# Patient Record
Sex: MALE | Race: WHITE | HISPANIC OR LATINO | Employment: FULL TIME | URBAN - METROPOLITAN AREA
[De-identification: names, ages, dates, MRNs, and addresses within clinical notes are randomized per-mention and may not be internally consistent; named-entity substitution may affect disease eponyms.]

---

## 2017-12-23 ENCOUNTER — APPOINTMENT (EMERGENCY)
Dept: RADIOLOGY | Facility: HOSPITAL | Age: 27
End: 2017-12-23
Payer: COMMERCIAL

## 2017-12-23 ENCOUNTER — HOSPITAL ENCOUNTER (EMERGENCY)
Facility: HOSPITAL | Age: 27
Discharge: HOME | End: 2017-12-23
Admitting: EMERGENCY MEDICINE
Payer: COMMERCIAL

## 2017-12-23 VITALS
OXYGEN SATURATION: 99 % | WEIGHT: 178 LBS | RESPIRATION RATE: 18 BRPM | TEMPERATURE: 99.2 F | DIASTOLIC BLOOD PRESSURE: 72 MMHG | SYSTOLIC BLOOD PRESSURE: 164 MMHG | HEART RATE: 97 BPM

## 2017-12-23 DIAGNOSIS — S52.125A CLOSED NONDISPLACED FRACTURE OF HEAD OF LEFT RADIUS, INITIAL ENCOUNTER: Primary | ICD-10-CM

## 2017-12-23 PROCEDURE — 99283 EMERGENCY DEPT VISIT LOW MDM: CPT

## 2017-12-23 PROCEDURE — 73080 X-RAY EXAM OF ELBOW: CPT

## 2017-12-23 PROCEDURE — 96372 THER/PROPH/DIAG INJ SC/IM: CPT

## 2017-12-23 PROCEDURE — 73110 X-RAY EXAM OF WRIST: CPT

## 2017-12-23 RX ORDER — MORPHINE SULFATE 15 MG/1
7.5 TABLET ORAL EVERY 6 HOURS PRN
Qty: 6 TABLET | Refills: 0 | Status: SHIPPED | OUTPATIENT
Start: 2017-12-23

## 2017-12-23 RX ORDER — FERROUS SULFATE 325(65) MG
325 TABLET ORAL
COMMUNITY

## 2017-12-23 RX ORDER — NAPROXEN 500 MG/1
500 TABLET ORAL 2 TIMES DAILY WITH MEALS
Qty: 20 TABLET | Refills: 0 | Status: SHIPPED | OUTPATIENT
Start: 2017-12-23

## 2017-12-23 RX ORDER — ACETAMINOPHEN 500 MG
500 TABLET ORAL EVERY 8 HOURS PRN
Qty: 30 TABLET | Refills: 0 | Status: SHIPPED | OUTPATIENT
Start: 2017-12-23

## 2017-12-23 RX ORDER — KETOROLAC TROMETHAMINE 30 MG/ML
30 INJECTION, SOLUTION INTRAMUSCULAR; INTRAVENOUS ONCE
Status: COMPLETED | OUTPATIENT
Start: 2017-12-23 | End: 2017-12-23

## 2017-12-23 RX ADMIN — KETOROLAC TROMETHAMINE 30 MG: 30 INJECTION, SOLUTION INTRAMUSCULAR at 17:11

## 2017-12-23 NOTE — DISCHARGE INSTRUCTIONS
- start taking Naprosyn 500 milligrams twice daily for pain  - take Tylenol 1000 milligrams every 8 hours for mild-to-moderate pain  - take half of a morphine 15 mgevery 6 hours for severe pain  If you are taking morphine do not drive or operate machinery  - call Orthopedics in 3 days to schedule follow-up for radial head fracture  - do not take off splint prior to being examined by Orthopedics    Arm Fracture in [facility]:   An arm fracture is a crack or break in one or more of the bones in your arm  An arm fracture may be caused by a fall onto an outstretched hand  It may also be caused by trauma from a car accident or a sports injury  Osteoporosis (brittle bones) can increase your risk for a fracture  DISCHARGE INSTRUCTIONS:   Return to the emergency department if:   · The pain in your injured arm does not get better or gets worse, even after you rest and take medicine  · Your injured arm, hand, or fingers feel numb  · Your arm is swollen, red, and feels warm  · Your skin over the arm fracture is swollen, cold, or pale  · You cannot move your arm, hand, or fingers  Contact your healthcare provider if:   · You have a fever  · Your brace or splint becomes wet, damaged, or comes off  · You have questions or concerns about your injury, treatment, or care  Medicines:   · NSAIDs , such as ibuprofen, help decrease swelling and pain  This medicine is available with or without a doctor's order  NSAIDs can cause stomach bleeding or kidney problems in certain people  If you take blood thinner medicine, always ask your healthcare provider if NSAIDs are safe for you  Always read the medicine label and follow directions  · Acetaminophen  decreases pain  It is available without a doctor's order  Ask how much to take and how often to take it  Follow directions  Acetaminophen can cause liver damage if not taken correctly  · Prescription pain medicine  may be given   Ask how to take this medicine safely  · Take your medicine as directed  Contact your healthcare provider if you think your medicine is not helping or if you have side effects  Tell him or her if you are allergic to any medicine  Keep a list of the medicines, vitamins, and herbs you take  Include the amounts, and when and why you take them  Bring the list or the pill bottles to follow-up visits  Carry your medicine list with you in case of an emergency  Follow up with your healthcare provider within 1 week: You may need to see a bone specialist within 3 to 4 days if you need surgery or further treatment for your arm fracture  Write down your questions so you remember to ask them during your visits  Rest:  You should rest your arm as much as possible  Ask your healthcare provider when you can put pressure or weight on your arm  Also ask when you can return to sports or vigorous exercises  Ice:  Apply ice on your arm for 15 to 20 minutes every hour or as directed  Use an ice pack, or put crushed ice in a plastic bag  Cover it with a towel  Ice helps prevent tissue damage and decreases swelling and pain  Elevate:  Elevate your arm above the level of your heart as often as you can  This will help decrease swelling and pain  Prop your arm on pillows or blankets to keep it elevated comfortably  Care for your cast or splint:  Ask your healthcare provider when it is okay to bathe  Do not get your cast or splint wet  Before you take a bath or shower, cover your cast or splint with a plastic bag  Tape the bag to your skin to help keep water out  Hold your arm away from the water in case the bag leaks  · Check the skin around your cast or splint each day for any redness or open skin  · Do not use a sharp or pointed object to scratch your skin under the cast or splint  Physical therapy:  A physical therapist teaches you exercises to help improve movement and strength, and to decrease pain     © 2017 [employer] Information is for End User's use only and may not be sold, redistributed or otherwise used for commercial purposes  All illustrations and images included in CareNotes® are the copyrighted property of [employer]  or [employer]  The above information is an  only  It is not intended as medical advice for individual conditions or treatments  Talk to your doctor, nurse or pharmacist before following any medical regimen to see if it is safe and effective for you

## 2017-12-23 NOTE — ED PROVIDER NOTES
History  Chief Complaint   Patient presents with    Arm Injury     Patient was coming down ladder to get into cart and fell backwards, put arm out and hit elbow on ground  Patient reporting elbow pain  History provided by:  Patient  Arm Injury   Location:  Elbow  Elbow location:  L elbow  Injury: yes    Time since incident:  2 hours  Pain details:     Quality:  Sharp    Radiates to:  Does not radiate    Severity:  Severe    Onset quality:  Sudden    Timing:  Constant    Progression:  Worsening  Handedness:  Right-handed  Prior injury to area:  No  Relieved by:  Nothing  Exacerbated by: supination/pronation  Ineffective treatments:  None tried  Associated symptoms: decreased range of motion    Associated symptoms: no fatigue, no fever, no neck pain, no stiffness, no swelling and no tingling        Prior to Admission Medications   Prescriptions Last Dose Informant Patient Reported? Taking?   ferrous sulfate 325 (65 Fe) mg tablet   Yes Yes   Sig: Take 325 mg by mouth daily with breakfast      Facility-Administered Medications: None       History reviewed  No pertinent past medical history  History reviewed  No pertinent surgical history  History reviewed  No pertinent family history  I have reviewed and agree with the history as documented  Social History   Substance Use Topics    Smoking status: Current Every Day Smoker     Packs/day: 0 50     Types: Cigarettes    Smokeless tobacco: Never Used    Alcohol use Yes      Comment: occasionally        Review of Systems   Constitutional: Negative for activity change, appetite change, chills, fatigue and fever  HENT: Negative for ear pain, sneezing and sore throat  Eyes: Negative for pain and visual disturbance  Respiratory: Negative for cough and shortness of breath  Cardiovascular: Negative for chest pain and palpitations  Gastrointestinal: Negative for abdominal pain, blood in stool, constipation, diarrhea, nausea and vomiting  Genitourinary: Negative for dysuria and hematuria  Musculoskeletal: Negative for arthralgias, neck pain, neck stiffness and stiffness  Skin: Negative for rash and wound  Neurological: Negative for dizziness, weakness, light-headedness, numbness and headaches  All other systems reviewed and are negative  Physical Exam  ED Triage Vitals [12/23/17 1622]   Temperature Pulse Respirations Blood Pressure SpO2   99 2 °F (37 3 °C) 97 18 164/72 99 %      Temp Source Heart Rate Source Patient Position - Orthostatic VS BP Location FiO2 (%)   Oral Monitor Sitting Right arm --      Pain Score       9           Orthostatic Vital Signs  Vitals:    12/23/17 1622   BP: 164/72   Pulse: 97   Patient Position - Orthostatic VS: Sitting       Physical Exam   Constitutional: He is oriented to person, place, and time  He appears well-developed and well-nourished  No distress  HENT:   Head: Normocephalic and atraumatic  Nose: Nose normal    Eyes: Conjunctivae and EOM are normal  Pupils are equal, round, and reactive to light  Neck: Normal range of motion  Neck supple  Cardiovascular: Normal rate, regular rhythm, normal heart sounds and intact distal pulses  Exam reveals no gallop and no friction rub  No murmur heard  Pulmonary/Chest: Effort normal and breath sounds normal  No respiratory distress  He has no wheezes  He has no rales  Abdominal: Soft  He exhibits no distension  There is no tenderness  Musculoskeletal: Normal range of motion  He exhibits tenderness (over left radial head  )  He exhibits no edema or deformity  Unable to pronate or supinate left forearm secondary to pain  Can fully flex/extend at left elbow  No shoulder/clavicle pain  Painful wrist ROM at full flexion  No snuffbox/scaffoid tenderness  Lymphadenopathy:     He has no cervical adenopathy  Neurological: He is alert and oriented to person, place, and time  Skin: Skin is warm and dry  Capillary refill takes less than 2 seconds  He is not diaphoretic  No erythema  No pallor  Nursing note and vitals reviewed  ED Medications  Medications   ketorolac (TORADOL) injection 30 mg (30 mg Intramuscular Given 12/23/17 1711)       Diagnostic Studies  Results Reviewed     None                 XR elbow 3+ vw LEFT   ED Interpretation by Suzan Gonzalez PA-C (12/23 1712)   Abnormal   Buckle fracture of radial head with interosseus widening suggestive of membrane tear      Final Result by Marie Carreno MD (12/23 3316)      Acute radial head fracture with effusion  This report is in agreement with the preliminary interpretation  Workstation performed: [id number]         XR wrist 3+ views LEFT   ED Interpretation by Suzan Gonzalez PA-C (12/23 8285)   No acute fracture      Final Result by Marie Carreno MD (12/23 4640)      No acute osseous abnormality  Workstation performed: [id number]                    Procedures  Static Splint Application  Date/Time: 12/23/2017 6:19 PM  Performed by: Caleb Machado by: Marian Crew     Consent:     Consent obtained:  Verbal    Consent given by:  Patient  Universal protocol:     Imaging studies available: yes    Indication:     Indications: fracture    Pre-procedure details:     Sensation:  Normal  Procedure details:     Laterality:  Left    Location:  Elbow    Elbow:  L elbow    Splint type:  Long arm and sugar tong    Supplies:  Ortho-Glass, sling and cotton padding  Post-procedure details:     Pain:  Unchanged    Sensation:  Normal    Neurovascular Exam: skin pink, capillary refill <2 sec and skin intact, warm, and dry      Patient tolerance of procedure:   Tolerated well, no immediate complications           Phone Contacts  ED Phone Contact    ED Course  ED Course                                MDM  Number of Diagnoses or Management Options  Closed nondisplaced fracture of head of left radius, initial encounter: new and requires workup  Diagnosis management comments: ddx to include but not limited to: radial head fracture, olecranon fracture, elbow sprain, clavicular fracture, scaphoid fracture    Pt is a 33 yo male who reports tripping while stepping out of a car at work, landing on an outstretched left hand  Pt reports left elbow pain immediately  No numbness or tingling  No shoulder or wrist pain  Patient states he cant rotate his forearm but can still flex/extend arm  No prior injuries to area  Plan will be to get XR to rule out fracture  Update: radial head fracture with joint effusion seen on xr  Will check left wrist XR to rule out fracture associated with prox radial head fracture  Will place patient in posterior long arm and elbow stirrup splint  Shoulder sling  Morphine IR as needed for severe pain x3 days  Naproxen 500 BID for swelling and pain  Tylenol 1000mg q8 for pain  Orthopedics follow up in 3 days  Strict return precautions  Amount and/or Complexity of Data Reviewed  Tests in the radiology section of CPT®: ordered and reviewed    Risk of Complications, Morbidity, and/or Mortality  Presenting problems: moderate  Diagnostic procedures: low  Management options: moderate    Patient Progress  Patient progress: improved    CritCare Time    Disposition  Final diagnoses:   Closed nondisplaced fracture of head of left radius, initial encounter     Time reflects when diagnosis was documented in both MDM as applicable and the Disposition within this note     Time User Action Codes Description Comment    12/23/2017  5:56 PM Mary Lim Add [[id number]] Closed nondisplaced fracture of head of left radius, initial encounter       ED Disposition     ED Disposition Condition Comment    Discharge  Malvin Filter discharge to home/self care      Condition at discharge: Stable        Follow-up Information     Follow up With Specialties Details Why Contact Info Additional [address] [facility] Orthopedic Surgery Call in 3 days for radial head fracture follow up  [address] [id number]  [facility] Emergency Department Emergency Medicine  If symptoms worsen [address]  [phone]  ED, [address], [place], [address]        Discharge Medication List as of 12/23/2017  6:17 PM      START taking these medications    Details   acetaminophen (TYLENOL) 500 mg tablet Take 1 tablet by mouth every 8 (eight) hours as needed for mild pain, Starting Sat 12/23/2017, Print      morphine (MSIR) 15 mg tablet Take 0 5 tablets by mouth every 6 (six) hours as needed for severe pain Max Daily Amount: 30 mg, Starting Sat 12/23/2017, Print      naproxen (NAPROSYN) 500 mg tablet Take 1 tablet by mouth 2 (two) times a day with meals, Starting Sat 12/23/2017, Print         CONTINUE these medications which have NOT CHANGED    Details   ferrous sulfate 325 (65 Fe) mg tablet Take 325 mg by mouth daily with breakfast, Historical Med           No discharge procedures on file      ED Provider  Electronically Signed by           Maria De Jesus Walters PA-C  12/24/17 4959

## 2017-12-29 ENCOUNTER — ALLSCRIPTS OFFICE VISIT (OUTPATIENT)
Dept: OTHER | Facility: OTHER | Age: 27
End: 2017-12-29

## 2018-01-03 NOTE — PROGRESS NOTES
Assessment   1  Closed nondisplaced fracture of head of left radius, initial encounter (813 05) (S52 [id number])    Plan   Closed nondisplaced fracture of head of left radius, initial encounter    · Sling; Status:Complete;   Done: [id number]   · Follow-up visit in 1 month Evaluation and Treatment  Follow-up  Status: Hold For -    Scheduling  Requested for: [id number]    Discussion/Summary      Discussed history, subjective symptoms and physical exam findings with patient  He has a closed nondisplaced fracture of the radial head  He is now 6 days post injury  He can slowly come out of the sling and start with ROM exercises as tolerated  Will see him back in 1 month for re-evaluation and check ROM  He should avoid any lifting or weight bearing of the left arm until re-evaluation  The patient, patient's family was counseled regarding diagnostic results,-- instructions for management,-- risk factor reductions,-- prognosis,-- patient and family education,-- impressions,-- risks and benefits of treatment options,-- importance of compliance with treatment  The treatment plan was reviewed with the patient/guardian  The patient/guardian understands and agrees with the treatment plan       Self Referrals: No      Chief Complaint   1  Wrist Pain  Left elbow injury      History of Present Illness   Elbow Problem: The patient is being seen for an initial evaluation of an elbow problem  The patient is right hand dominant  Symptoms:  decreased range of motion  The patient is currently experiencing symptoms  Exacerbating factors:  moving the elbow  Relieving factors:  immobilization  HPI: 31 yo M comes with family to be evaluated for left elbow injury sustained on 12/23/17 when he fell down stairs at home  He slipped and fell and placed his left arm out to brace his fall  He had significant pain with his left elbow immediately after falling   He presented to the ED where he had radiographs done demonstrating a radial head fracture with no displacement  He has been wearing a sling and splint since the injury  No numbness, tingling or weakness in his hand or fingers  Wrist Pain: LUC AMAYA presents with complaints of no wrist pain  Review of Systems        Constitutional: No fever or chills, feels well, no tiredness, no recent weight loss or weight gain  Eyes: No complaints of red eyes, no eyesight problems  ENT: no complaints of loss of hearing, no nosebleeds, no sore throat  Cardiovascular: No complaints of chest pain, no palpitations, no leg claudication or lower extremity edema  Respiratory: No complaints of shortness of breath, no wheezing, no cough  Gastrointestinal: No complaints of abdominal pain, no constipation, no nausea or vomiting, no diarrhea or bloody stools  Genitourinary: No complaints of dysuria or incontinence, no hesitancy, no nocturia  Musculoskeletal: as noted in HPI  Integumentary: No complaints of skin rash or lesion, no itching or dry skin, no skin wounds  Neurological: No complaints of headache, no confusion, no numbness or tingling, no dizziness  Psychiatric: No suicidal thoughts, no anxiety, no depression  Endocrine: No muscle weakness, no frequent urination, no excessive thirst, no feelings of weakness  ROS reviewed  Past Medical History      The active problems and past medical history were reviewed and updated today  Surgical History      The surgical history was reviewed and updated today  Family History   Mother    · Family history of malignant neoplasm (V16 9) (Z80 9)  Grandmother    · Family history of arthritis (V17 7) (Z82 61)     The family history was reviewed and updated today  Social History    · Coffee   · Current every day smoker (305 1) (F17 200)   · Social drinker (V49 89) (Z78 9)  The social history was reviewed and updated today  The social history was reviewed and is unchanged  Current Meds   1  Morphine Sulfate ER 15 MG TB12;     Therapy: (Recorded:[id number]) to Recorded  2  Naproxen 500 MG Oral Tablet; Therapy: (Recorded:65Oae2072) to Recorded     The medication list was reviewed and updated today  Allergies   1  No Known Drug Allergies    Vitals    Recorded: 10UAM9257 10:12AM   Heart Rate 68   Systolic 928   Diastolic 71   Height 5 ft 9 in     Physical Exam      Left Elbow: Appearance: Normal  Tenderness: radial head  Flexion: restricted AROM 110 degrees  Extension: restricted AROM 20 degrees  Pain with limited pronation and supination  Motor: Normal       Constitutional - General appearance: Normal       Musculoskeletal - Gait and station: Normal -- Upper extremity compartments: Normal       Cardiovascular - Pulses: Normal -- Examination of extremities for edema and/or varicosities: Normal       Abdomen - Abdomen - Soft, nontender, nondistended  Abdomen: The abdomen was flat  Skin - Skin and subcutaneous tissue: Normal -- Palpation of skin and subcutaneous tissue: Normal       Neurologic - Sensation: Normal -- Upper extremity peripheral neuro exam: Normal       Psychiatric - Orientation to person, place, and time: Normal -- Mood and affect: Normal       Eyes      Conjunctiva and lids: Normal        Pupils and irises: Normal        Results/Data   I personally reviewed the films/images/results in the office today  My interpretation follows  X-ray Review Radiographs from the ED were reviewed  There is a non displaced fracture of the radial head  There are no acute osseous abnormalities of the wrist       Message   Return to work or school:    Reena Suero is under my professional care  He was seen in my office on 12/29/17        No weight bearing or lifting with left arm until cleared by physician  Elisabet Botello DO        Attending Note   Attending Note [facility]: Attending Note:1  I interviewed, took the history and examined the patient1 ,-- I discussed the case with the Resident and reviewed the Resident's note1 ,-- I supervised the Resident1 -- and-- I agree with the Resident management plan as it was presented to [facility]   Level of Participation:1  I was present in clinic and examined the patient1   I agree with the Resident's note1        1 Amended By: Wiliam Adhikari; Jan 02 2018 9:56 AM EST      Future Appointments      Date/Time Provider Specialty Site   01/29/2018 10:40 AM CODIE Goode  Orthopedic Surgery [facility]     Signatures    Electronically signed by : Vane Patterson DO; Dec 29 2017  1:51PM EST                       (Author)     Electronically signed by :  CODIE Molina ; Jan 2 2018  9:56AM EST                       (Author)

## 2018-01-15 PROCEDURE — 88305 TISSUE EXAM BY PATHOLOGIST: CPT | Performed by: INTERNAL MEDICINE

## 2018-01-15 PROCEDURE — 88313 SPECIAL STAINS GROUP 2: CPT | Performed by: INTERNAL MEDICINE

## 2018-01-15 PROCEDURE — 88342 IMHCHEM/IMCYTCHM 1ST ANTB: CPT | Performed by: INTERNAL MEDICINE

## 2018-01-16 ENCOUNTER — LAB REQUISITION (OUTPATIENT)
Dept: LAB | Facility: HOSPITAL | Age: 28
End: 2018-01-16
Payer: COMMERCIAL

## 2018-01-16 DIAGNOSIS — R11.2 NAUSEA WITH VOMITING: ICD-10-CM

## 2018-01-16 DIAGNOSIS — R10.13 EPIGASTRIC PAIN: ICD-10-CM

## 2018-01-16 DIAGNOSIS — K21.9 GASTRO-ESOPHAGEAL REFLUX DISEASE WITHOUT ESOPHAGITIS: ICD-10-CM

## 2018-01-23 VITALS — HEART RATE: 68 BPM | HEIGHT: 69 IN | SYSTOLIC BLOOD PRESSURE: 108 MMHG | DIASTOLIC BLOOD PRESSURE: 71 MMHG

## 2018-01-23 NOTE — MISCELLANEOUS
Message  Return to work or school:   Naveen Reid is under my professional care  He was seen in my office on 12/29/17       No weight bearing or lifting with left arm until cleared by physician  Rhonda Camp DO  Signatures   Electronically signed by : Rhonda Camp DO; Dec 29 2017  1:00PM EST                       (Author)    Electronically signed by : Rhonda Camp DO; Dec 29 2017  1:51PM EST                       (Author)    Electronically signed by : Rhonda Camp DO; Dec 29 2017  1:51PM EST                       (Author)    Electronically signed by :  CODIE Luis ; Jan 2 2018  9:56AM EST                       (Author)

## 2018-02-05 ENCOUNTER — OFFICE VISIT (OUTPATIENT)
Dept: OBGYN CLINIC | Facility: OTHER | Age: 28
End: 2018-02-05

## 2018-02-05 VITALS
BODY MASS INDEX: 24.44 KG/M2 | SYSTOLIC BLOOD PRESSURE: 123 MMHG | HEIGHT: 69 IN | WEIGHT: 165 LBS | HEART RATE: 85 BPM | DIASTOLIC BLOOD PRESSURE: 82 MMHG

## 2018-02-05 DIAGNOSIS — S52.125D CLOSED NONDISPLACED FRACTURE OF HEAD OF LEFT RADIUS WITH ROUTINE HEALING, SUBSEQUENT ENCOUNTER: Primary | ICD-10-CM

## 2018-02-05 PROBLEM — S52.126D CLOSED NONDISPLACED FRACTURE OF HEAD OF RADIUS WITH ROUTINE HEALING: Status: ACTIVE | Noted: 2018-02-05

## 2018-02-05 PROCEDURE — 99024 POSTOP FOLLOW-UP VISIT: CPT | Performed by: ORTHOPAEDIC SURGERY

## 2018-02-05 RX ORDER — DICYCLOMINE HYDROCHLORIDE 10 MG/1
10 CAPSULE ORAL
COMMUNITY

## 2018-02-05 RX ORDER — OMEPRAZOLE 40 MG/1
CAPSULE, DELAYED RELEASE ORAL
Refills: 2 | COMMUNITY
Start: 2018-01-16

## 2018-02-05 NOTE — PROGRESS NOTES
Assessment/Plan:  Assessment/Plan   Diagnoses and all orders for this visit:    Closed nondisplaced fracture of head of left radius with routine healing, subsequent encounter    Other orders  -     omeprazole (PriLOSEC) 40 MG capsule; TAKE 1 CAPSULE BY MOUTH EVERY DAY 1/2HR BEFORE BREAKFAST  -     dicyclomine (BENTYL) 10 mg capsule; Take 10 mg by mouth 4 (four) times a day (before meals and at bedtime)             We discussed with Mr Natalie Villanueva our findings of developing stiffness  We have recommended started strengthening using resistance bands for elbow flexion and extension  We will follow in 4 weeks if there are continued symptoms  If he returns to baseline, then he can call to cancel this appt  Subjective:          Regina Miguel is a 32year old RHD male presenting for re-evaluation of a left elbow radial head fracture sustained on 12/23/2017  At his last visit, he was instructed to begin ROM exercises with activity modifications  He has been compliant with the previous recommendations  Today, he explains that he continues to notice a sharp pain at end range of motion, though his range of motion has improved dramatically since last visit  He mostly notices this pain over his triceps and forearm flexors rather than over the radial head  He denies any numbness or tingling of the arm/hand  The following portions of the patient's history were reviewed and updated as appropriate: allergies, current medications, past family history, past medical history, past social history, past surgical history and problem list     Review of Systems   Musculoskeletal:        As noted in HPI   All other systems reviewed and are negative  Objective:  Left Elbow Exam     Tenderness   Left elbow tenderness location: Mild tenderness to deep palpation noted of the insertion of the triceps during resisted elbow extension       Range of Motion   Extension: normal   Flexion: normal   Pronation: normal   Supination: normal Muscle Strength   Pronation:  5/5   Supination:  5/5     Tests Tinel's Sign (cubital tunnel): negative    Other   Erythema: absent  Sensation: normal  Pulse: present            Physical Exam   Constitutional: He is oriented to person, place, and time  He appears well-developed and well-nourished  No distress  HENT:   Head: Normocephalic and atraumatic  Eyes: Conjunctivae and EOM are normal  Pupils are equal, round, and reactive to light  Right eye exhibits no discharge  Left eye exhibits no discharge  No scleral icterus  Cardiovascular: Normal rate, regular rhythm and intact distal pulses  Pulmonary/Chest: Effort normal  No respiratory distress  Neurological: He is alert and oriented to person, place, and time  Skin: Skin is warm and dry  He is not diaphoretic  Psychiatric: He has a normal mood and affect  His behavior is normal  Judgment and thought content normal        I have personally reviewed pertinent films in PACS

## 2019-01-14 ENCOUNTER — HOSPITAL ENCOUNTER (EMERGENCY)
Facility: HOSPITAL | Age: 29
Discharge: HOME | End: 2019-01-14
Attending: EMERGENCY MEDICINE
Payer: COMMERCIAL

## 2019-01-14 VITALS
HEART RATE: 80 BPM | DIASTOLIC BLOOD PRESSURE: 90 MMHG | RESPIRATION RATE: 18 BRPM | BODY MASS INDEX: 23.63 KG/M2 | WEIGHT: 160 LBS | OXYGEN SATURATION: 100 % | TEMPERATURE: 98.8 F | SYSTOLIC BLOOD PRESSURE: 148 MMHG

## 2019-01-14 DIAGNOSIS — R10.13 EPIGASTRIC PAIN: Primary | ICD-10-CM

## 2019-01-14 LAB
ALBUMIN SERPL BCP-MCNC: 4.8 G/DL (ref 3.5–5)
ALP SERPL-CCNC: 67 U/L (ref 46–116)
ALT SERPL W P-5'-P-CCNC: 27 U/L (ref 12–78)
ANION GAP SERPL CALCULATED.3IONS-SCNC: 6 MMOL/L (ref 4–13)
AST SERPL W P-5'-P-CCNC: 16 U/L (ref 5–45)
BASOPHILS # BLD AUTO: 0.03 THOUSANDS/ΜL (ref 0–0.1)
BASOPHILS NFR BLD AUTO: 0 % (ref 0–1)
BILIRUB SERPL-MCNC: 1.12 MG/DL (ref 0.2–1)
BUN SERPL-MCNC: 13 MG/DL (ref 5–25)
CALCIUM SERPL-MCNC: 9.1 MG/DL (ref 8.3–10.1)
CHLORIDE SERPL-SCNC: 102 MMOL/L (ref 100–108)
CO2 SERPL-SCNC: 28 MMOL/L (ref 21–32)
CREAT SERPL-MCNC: 1.12 MG/DL (ref 0.6–1.3)
EOSINOPHIL # BLD AUTO: 0.05 THOUSAND/ΜL (ref 0–0.61)
EOSINOPHIL NFR BLD AUTO: 1 % (ref 0–6)
ERYTHROCYTE [DISTWIDTH] IN BLOOD BY AUTOMATED COUNT: 11.7 % (ref 11.6–15.1)
GFR SERPL CREATININE-BSD FRML MDRD: 88 ML/MIN/1.73SQ M
GLUCOSE SERPL-MCNC: 100 MG/DL (ref 65–140)
HCT VFR BLD AUTO: 45.1 % (ref 36.5–49.3)
HGB BLD-MCNC: 15.5 G/DL (ref 12–17)
IMM GRANULOCYTES # BLD AUTO: 0.03 THOUSAND/UL (ref 0–0.2)
IMM GRANULOCYTES NFR BLD AUTO: 0 % (ref 0–2)
LIPASE SERPL-CCNC: 53 U/L (ref 73–393)
LYMPHOCYTES # BLD AUTO: 2.49 THOUSANDS/ΜL (ref 0.6–4.47)
LYMPHOCYTES NFR BLD AUTO: 23 % (ref 14–44)
MCH RBC QN AUTO: 32 PG (ref 26.8–34.3)
MCHC RBC AUTO-ENTMCNC: 34.4 G/DL (ref 31.4–37.4)
MCV RBC AUTO: 93 FL (ref 82–98)
MONOCYTES # BLD AUTO: 0.56 THOUSAND/ΜL (ref 0.17–1.22)
MONOCYTES NFR BLD AUTO: 5 % (ref 4–12)
NEUTROPHILS # BLD AUTO: 7.93 THOUSANDS/ΜL (ref 1.85–7.62)
NEUTS SEG NFR BLD AUTO: 71 % (ref 43–75)
NRBC BLD AUTO-RTO: 0 /100 WBCS
PLATELET # BLD AUTO: 286 THOUSANDS/UL (ref 149–390)
PMV BLD AUTO: 9.7 FL (ref 8.9–12.7)
POTASSIUM SERPL-SCNC: 3.6 MMOL/L (ref 3.5–5.3)
PROT SERPL-MCNC: 8.5 G/DL (ref 6.4–8.2)
RBC # BLD AUTO: 4.84 MILLION/UL (ref 3.88–5.62)
SODIUM SERPL-SCNC: 136 MMOL/L (ref 136–145)
WBC # BLD AUTO: 11.09 THOUSAND/UL (ref 4.31–10.16)

## 2019-01-14 PROCEDURE — 82272 OCCULT BLD FECES 1-3 TESTS: CPT

## 2019-01-14 PROCEDURE — 85025 COMPLETE CBC W/AUTO DIFF WBC: CPT | Performed by: EMERGENCY MEDICINE

## 2019-01-14 PROCEDURE — 99284 EMERGENCY DEPT VISIT MOD MDM: CPT

## 2019-01-14 PROCEDURE — 83690 ASSAY OF LIPASE: CPT | Performed by: EMERGENCY MEDICINE

## 2019-01-14 PROCEDURE — 80053 COMPREHEN METABOLIC PANEL: CPT | Performed by: EMERGENCY MEDICINE

## 2019-01-14 PROCEDURE — 36415 COLL VENOUS BLD VENIPUNCTURE: CPT

## 2019-01-14 RX ORDER — MAGNESIUM HYDROXIDE/ALUMINUM HYDROXICE/SIMETHICONE 120; 1200; 1200 MG/30ML; MG/30ML; MG/30ML
30 SUSPENSION ORAL ONCE
Status: COMPLETED | OUTPATIENT
Start: 2019-01-14 | End: 2019-01-14

## 2019-01-14 RX ADMIN — ALUMINUM HYDROXIDE, MAGNESIUM HYDROXIDE, AND SIMETHICONE 30 ML: 200; 200; 20 SUSPENSION ORAL at 17:00

## 2019-01-14 RX ADMIN — LIDOCAINE HYDROCHLORIDE 15 ML: 20 SOLUTION ORAL; TOPICAL at 17:01

## 2019-01-14 NOTE — ED PROVIDER NOTES
ASSESSMENT AND PLAN    Marcellus Angelo is a 25 y o  male with a history of   Peptic ulcer disease, who presents with  Epigastric pain, intermittent over the last several months, radiating to the right upper quadrant,  Associated with dark stools  On arrival, the patient is hemodynamically stable and well-appearing without acute distress, with a nontoxic appearance  His physical exam is largely unremarkable, including a benign abdominal exam, as well as a normal rectal exam with guaiac-negative stool in the rectal vault   -Lab work largely unremarkable, including normal LFTs  Unclear etiology of the patient's prior LFT elevation  - bedside right upper quadrant ultrasound negative for signs for cholecystitis  - patient was given GI cocktail, with viscous lidocaine and Maalox, and reports relief of his symptoms   -  Based on benign abdominal exam, as well as chronic nature of the symptoms, outpatient follow-up is recommended  The patient already has follow-up established with a gastroenterologist, and I recommended the patient contact this physician for re-evaluation, and possible colonoscopy  - plan for discharge home  Strict return precautions provided    History  Chief Complaint   Patient presents with    Abdominal Pain     pt states he has had ulcers in his stomach in the past  pt reports black stool, loss of appetite, weight gain, and abdominal pain  This is a 64-year-old male who presents for evaluation of epigastric pain  The patient reports migratory abdominal pain over the last several months, which had initially improved after being given Protonix by his gastroenterologist, but has returned  He does report a history of ulcers, which were diagnosed via EGD, however has not received a colonoscopy  He has no other relevant medical history    The patient states that "my liver enzymes were elevated", seen on lab work done at outside facility, so he came to the emergency department  Today due to concerns that something may been wrong with his liver  The patient states that that today his pain is located in the midline epigastrium, radiating to the right side, sharp in nature, and worse with food  Patient endorses several bowel movements, reported as "dark stools"  He denies any nausea, vomiting, or diarrhea  He states the pain is similar to his normal abdominal pain, however it is at a different location today  He denies any fevers, chills, chest pain, shortness of breath, nausea, vomiting, diarrhea, urinary symptoms  He has no recent travel, recent surgeries, recent hospitalizations  Prior to Admission Medications   Prescriptions Last Dose Informant Patient Reported? Taking?   acetaminophen (TYLENOL) 500 mg tablet   No No   Sig: Take 1 tablet by mouth every 8 (eight) hours as needed for mild pain   dicyclomine (BENTYL) 10 mg capsule  Self Yes No   Sig: Take 10 mg by mouth 4 (four) times a day (before meals and at bedtime)   ferrous sulfate 325 (65 Fe) mg tablet   Yes No   Sig: Take 325 mg by mouth daily with breakfast   morphine (MSIR) 15 mg tablet   No No   Sig: Take 0 5 tablets by mouth every 6 (six) hours as needed for severe pain Max Daily Amount: 30 mg   naproxen (NAPROSYN) 500 mg tablet   No No   Sig: Take 1 tablet by mouth 2 (two) times a day with meals   omeprazole (PriLOSEC) 40 MG capsule   Yes No   Sig: TAKE 1 CAPSULE BY MOUTH EVERY DAY 1/2HR BEFORE BREAKFAST      Facility-Administered Medications: None       Past Medical History:   Diagnosis Date    Closed nondisplaced fracture of head of radius with routine healing 2/5/2018       History reviewed  No pertinent surgical history  Family History   Problem Relation Age of Onset    Cancer Mother     No Known Problems Father     Diabetes Paternal Grandmother      I have reviewed and agree with the history as documented      Social History   Substance Use Topics    Smoking status: Current Every Day Smoker     Packs/day: 0 50     Types: Cigarettes    Smokeless tobacco: Never Used    Alcohol use Yes      Comment: occasionally        Review of Systems   Constitutional: Negative for chills and fever  HENT: Negative for congestion and sinus pain  Eyes: Negative for photophobia and visual disturbance  Respiratory: Negative for cough and shortness of breath  Cardiovascular: Negative for chest pain and palpitations  Gastrointestinal: Positive for abdominal pain  Negative for blood in stool, diarrhea, nausea and vomiting  Genitourinary: Negative for dysuria and hematuria  Musculoskeletal: Negative for neck pain and neck stiffness  Skin: Negative for pallor and rash  Neurological: Negative for light-headedness and headaches  Physical Exam  ED Triage Vitals [01/14/19 1335]   Temperature Pulse Respirations Blood Pressure SpO2   98 8 °F (37 1 °C) 80 18 148/90 100 %      Temp Source Heart Rate Source Patient Position - Orthostatic VS BP Location FiO2 (%)   Oral Monitor Lying Right arm --      Pain Score       4           Orthostatic Vital Signs  Vitals:    01/14/19 1335   BP: 148/90   Pulse: 80   Patient Position - Orthostatic VS: Lying       Physical Exam   Constitutional: He is oriented to person, place, and time  Resting comfortably on stretcher  Awake alert, well-appearing, no acute distress  Nontoxic in appearance  Appears stated age   HENT:   Head: Normocephalic and atraumatic  Mouth/Throat: Oropharynx is clear and moist  No oropharyngeal exudate  Eyes: Pupils are equal, round, and reactive to light  No scleral icterus  Neck: Normal range of motion  No JVD present  Cardiovascular: Normal rate, regular rhythm and normal heart sounds  No murmur heard  Pulmonary/Chest: Effort normal  No respiratory distress  He has no wheezes  He has no rales  Abdominal: Soft  He exhibits no distension  There is no tenderness  Genitourinary: Rectum normal  Rectal exam shows guaiac negative stool     Musculoskeletal: Normal range of motion  He exhibits no edema  Neurological: He is alert and oriented to person, place, and time  He exhibits normal muscle tone  Skin: Skin is warm and dry  No rash noted  No pallor  ED Medications  Medications   lidocaine viscous (XYLOCAINE) 2 % mucosal solution 15 mL (15 mL Swish & Spit Given 1/14/19 1701)   aluminum-magnesium hydroxide-simethicone (MYLANTA) 200-200-20 mg/5 mL oral suspension 30 mL (30 mL Oral Given 1/14/19 1700)       Diagnostic Studies  Results Reviewed     Procedure Component Value Units Date/Time    Comprehensive metabolic panel [[id number]]  (Abnormal) Collected:  01/14/19 1345    Lab Status:  Final result Specimen:  Blood from Arm, Left Updated:  01/14/19 1423     Sodium 136 mmol/L      Potassium 3 6 mmol/L      Chloride 102 mmol/L      CO2 28 mmol/L      ANION GAP 6 mmol/L      BUN 13 mg/dL      Creatinine 1 12 mg/dL      Glucose 100 mg/dL      Calcium 9 1 mg/dL      AST 16 U/L      ALT 27 U/L      Alkaline Phosphatase 67 U/L      Total Protein 8 5 (H) g/dL      Albumin 4 8 g/dL      Total Bilirubin 1 12 (H) mg/dL      eGFR 88 ml/min/1 73sq m     Narrative:         National Kidney Disease Education Program recommendations are as follows:  GFR calculation is accurate only with a steady state creatinine  Chronic Kidney disease less than 60 ml/min/1 73 sq  meters  Kidney failure less than 15 ml/min/1 73 sq  meters      Lipase [[id number]]  (Abnormal) Collected:  01/14/19 1345    Lab Status:  Final result Specimen:  Blood from Arm, Left Updated:  01/14/19 1416     Lipase 53 (L) u/L     CBC and differential [[id number]]  (Abnormal) Collected:  01/14/19 1345    Lab Status:  Final result Specimen:  Blood from Arm, Left Updated:  01/14/19 1358     WBC 11 09 (H) Thousand/uL      RBC 4 84 Million/uL      Hemoglobin 15 5 g/dL      Hematocrit 45 1 %      MCV 93 fL      MCH 32 0 pg      MCHC 34 4 g/dL      RDW 11 7 %      MPV 9 7 fL      Platelets 631 Thousands/uL      nRBC 0 /100 WBCs Neutrophils Relative 71 %      Immat GRANS % 0 %      Lymphocytes Relative 23 %      Monocytes Relative 5 %      Eosinophils Relative 1 %      Basophils Relative 0 %      Neutrophils Absolute 7 93 (H) Thousands/µL      Immature Grans Absolute 0 03 Thousand/uL      Lymphocytes Absolute 2 49 Thousands/µL      Monocytes Absolute 0 56 Thousand/µL      Eosinophils Absolute 0 05 Thousand/µL      Basophils Absolute 0 03 Thousands/µL                  No orders to display         Procedures  Procedures      Phone Consults  ED Phone Contact    ED Course                               MDM  CritCare Time    Disposition  Final diagnoses:   Epigastric pain     Time reflects when diagnosis was documented in both MDM as applicable and the Disposition within this note     Time User Action Codes Description Comment    1/14/2019  5:46 PM Gianni Disla Add [R10 13] Epigastric pain       ED Disposition     ED Disposition Condition Comment    Discharge  Izzy Offer discharge to home/self care      Condition at discharge: Good        Follow-up Information     Follow up With Specialties Details Why Contact Info Additional [address] Internal Medicine   [facility] [address] [facility] [id number]  [address] [place] [address], [address] [place] [address], [place], [place], [phone]    [facility]   [facility] 10 [phone]  [facility] [address] [facility] [address] [place] [address], [phone]          Discharge Medication List as of 1/14/2019  5:47 PM      CONTINUE these medications which have NOT CHANGED    Details   acetaminophen (TYLENOL) 500 mg tablet Take 1 tablet by mouth every 8 (eight) hours as needed for mild pain, Starting Sat 12/23/2017, Print      dicyclomine (BENTYL) 10 mg capsule Take 10 mg by mouth 4 (four) times a day (before meals and at bedtime), Historical Med      ferrous sulfate 325 (65 Fe) mg tablet Take 325 mg by mouth daily with breakfast, Historical Med      morphine (MSIR) 15 mg tablet Take 0 5 tablets by mouth every 6 (six) hours as needed for severe pain Max Daily Amount: 30 mg, Starting Sat 12/23/2017, Print      naproxen (NAPROSYN) 500 mg tablet Take 1 tablet by mouth 2 (two) times a day with meals, Starting Sat 12/23/2017, Print      omeprazole (PriLOSEC) 40 MG capsule TAKE 1 CAPSULE BY MOUTH EVERY DAY 1/2HR BEFORE BREAKFAST, Historical Med           No discharge procedures on file  ED Provider  Attending physically available and evaluated [place] I managed the patient along with the ED Attending      Electronically Signed by         Ayan Waters MD  01/14/19 2550

## 2019-01-14 NOTE — DISCHARGE INSTRUCTIONS
Epigastric Pain   WHAT YOU NEED TO KNOW:   What do I need to know about epigastric pain? Epigastric pain is felt in the middle of the upper abdomen, between the ribs and the bellybutton  The pain may be mild or severe  Pain may spread from or to another part of your body  Epigastric pain may be a sign of a serious health problem that needs to be treated  What causes epigastric pain? The cause of your pain may not be known  The following are common causes:  · Inflammation of your stomach, liver, pancreas, or intestines    · Heart problems, such as a heart attack    · Digestion problems, such as indigestion, GERD, or lactose intolerance    · Medical conditions, such as an ulcer, a hernia, irritable bowel syndrome (IBS), or cancer    · A blockage in your bowels or gallbladder    · A bladder infection    · An injury or previous surgery in your abdomen  What other signs and symptoms may I have with epigastric pain? Signs and symptoms will depend on what is causing your pain  · Nausea, vomiting, bloating, constipation, or diarrhea    · Loss of appetite, weight loss, feeling of fullness as you start to eat    · Movement relieves the pain or makes it worse, or only certain positions are comfortable    · Pain when you eat, or pain that is relieved when you eat or have a bowel movement    · Sore throat or a hoarse voice  How is epigastric pain diagnosed and treated? Your healthcare provider will feel your abdomen to see if it is tender or rigid  He may change or stop any medicine you are taking that is causing your pain  Your pain may go away without treatment, or you may need any of the following:  · Medicines  may be given to treat pain or stop vomiting  You may also need medicines to reduce or control stomach acid, or treat an infection  · Blood or urine tests  may show problems such as infection or inflammation  The tests may also show how well your liver is working      · An x-ray  is used to check your kidneys and bladder  · An ultrasound  is used to check your gallbladder for stones or other blockage  · A bowel movement sample  may be tested for blood  How can I manage my symptoms? · Keep a record of your symptoms  Include when the pain starts, how long it lasts, and if it is sharp or dull  Also include any foods you ate or activities you did before the pain started  Keep track of anything that helped the pain  · Eat a variety of healthy foods  Healthy foods include fruits, vegetables, whole-grain breads, low-fat dairy products, beans, lean meats, and fish  Ask if you need to be on a special diet  Certain foods may cause your pain, such as alcohol or foods that are high in fat  You may need to eat smaller meals and to eat more often than usual     · Drink liquids as directed  Ask how much liquid to drink each day and which liquids are best for you  Do not have drinks that contain alcohol or caffeine  Call 911 for any of the following:   · You have any of the following signs of a heart attack:      ¨ Squeezing, pressure, or pain in your chest that lasts longer than 5 minutes or returns    ¨ Discomfort or pain in your back, neck, jaw, stomach, or arm     ¨ Trouble breathing    ¨ Nausea or vomiting    ¨ Lightheadedness or a sudden cold sweat, especially with chest pain or trouble breathing    · You have severe pain that radiates to your jaw or back  When should I seek immediate care? · You have severe pain that starts suddenly and quickly gets worse  · You cannot have a bowel movement and are vomiting  · You vomit or cough up blood  · You see blood in your urine or bowel movement  · You feel drowsy and your breathing is slower than usual   When should I contact my healthcare provider? · You have a fever or chills  · You have yellowing of your skin or the whites of your eyes  · You vomit often or several times in a row  · You lose weight without trying      · You have symptoms for longer than 2 weeks  · You have questions or concerns about your condition or care  CARE AGREEMENT:   You have the right to help plan your care  Learn about your health condition and how it may be treated  Discuss treatment options with your caregivers to decide what care you want to receive  You always have the right to refuse treatment  The above information is an  only  It is not intended as medical advice for individual conditions or treatments  Talk to your doctor, nurse or pharmacist before following any medical regimen to see if it is safe and effective for you  © 2017 [address]  Information is for End User's use only and may not be sold, redistributed or otherwise used for commercial purposes  All illustrations and images included in CareNotes® are the copyrighted property of [employer]  or [employer]

## 2019-01-14 NOTE — ED ATTENDING ATTESTATION
Joe Nguyen MD, saw and evaluated the patient  I have discussed the patient with the resident/non-physician practitioner and agree with the resident's/non-physician practitioner's findings, Plan of Care, and MDM as documented in the resident's/non-physician practitioner's note, except where noted  All available labs and Radiology studies were reviewed  At this point I agree with the current assessment done in the Emergency Department  I have conducted an independent evaluation of this patient a history and physical is as follows:  Pt has history fo gastritis and elevated lfts in September told not to drink Pt has 3 days of abd pain with 1 episode of vomiting and multiple episodes of dark stools   Pt has had gi jasso including scope and is taking protonix PE: alert heart reg lungs clear abd soft tender midepigastric area MDM; will do blood jasso try gi cocktail refer back to gi  Critical Care Time  CritCare Time    Procedures